# Patient Record
Sex: MALE | Employment: FULL TIME | URBAN - METROPOLITAN AREA
[De-identification: names, ages, dates, MRNs, and addresses within clinical notes are randomized per-mention and may not be internally consistent; named-entity substitution may affect disease eponyms.]

---

## 2017-02-22 DIAGNOSIS — Z72.0 TOBACCO ABUSE: ICD-10-CM

## 2017-02-22 RX ORDER — VARENICLINE TARTRATE 1 MG/1
1 TABLET, FILM COATED ORAL 2 TIMES DAILY
Qty: 60 TABLET | Refills: 0 | Status: SHIPPED | OUTPATIENT
Start: 2017-02-22 | End: 2017-03-23

## 2017-02-22 NOTE — TELEPHONE ENCOUNTER
Routing refill request to provider for review/approval because:  Labs out of range:  BP    Florida Oswald, RN  [facility]

## 2017-02-22 NOTE — TELEPHONE ENCOUNTER
varenicline (CHANTIX) 1 MG tablet     Last Written Prescription Date: 12-2-16  Last Fill Quantity: 60, # refills: 2  Last Office Visit with [facility], [facility] or [facility] prescribing provider: 12-2-16        BP Readings from Last 3 Encounters:   12/02/16 129/81   11/09/16 144/87   09/10/14 125/85

## 2017-03-23 DIAGNOSIS — Z72.0 TOBACCO ABUSE: ICD-10-CM

## 2017-03-23 NOTE — TELEPHONE ENCOUNTER
varenicline (CHANTIX) 1 MG tablet     Last Written Prescription Date: 2-22-17  Last Fill Quantity: 60, # refills: 0  Last Office Visit with [facility], [facility] or [facility] prescribing provider: 12-2-16        BP Readings from Last 3 Encounters:   12/02/16 129/81   11/09/16 144/87   09/10/14 125/85

## 2017-03-24 RX ORDER — VARENICLINE TARTRATE 1 MG/1
TABLET, FILM COATED ORAL
Qty: 60 TABLET | Refills: 0 | Status: SHIPPED | OUTPATIENT
Start: 2017-03-24 | End: 2017-04-20

## 2017-03-24 NOTE — TELEPHONE ENCOUNTER
Routing refill request to provider for review/approval because:  Max refills received per RN protocol.    Frances Ramirez RN   March 24, 2017 4:08 PM  [facility] Triage   [phone]

## 2017-04-20 DIAGNOSIS — Z72.0 TOBACCO ABUSE: ICD-10-CM

## 2017-04-20 NOTE — TELEPHONE ENCOUNTER
CHANTIX 1 MG tablet     Last Written Prescription Date: 3-24-17  Last Fill Quantity: 60, # refills: 0  Last Office Visit with [facility], [facility] or [facility] prescribing provider: 12-2-16        BP Readings from Last 3 Encounters:   12/02/16 129/81   11/09/16 144/87   09/10/14 125/85

## 2017-04-21 RX ORDER — VARENICLINE TARTRATE 1 MG/1
TABLET, FILM COATED ORAL
Qty: 56 TABLET | Refills: 0 | Status: SHIPPED | OUTPATIENT
Start: 2017-04-21 | End: 2017-08-17

## 2017-05-11 DIAGNOSIS — Z72.0 TOBACCO ABUSE: ICD-10-CM

## 2017-05-11 RX ORDER — VARENICLINE TARTRATE 1 MG/1
TABLET, FILM COATED ORAL
Qty: 56 TABLET | Refills: 0 | Status: SHIPPED | OUTPATIENT
Start: 2017-05-11 | End: 2017-08-17

## 2017-05-11 NOTE — TELEPHONE ENCOUNTER
CHANTIX CONTINUING MONTH DAVID 1 MG tablet     Last Written Prescription Date: 4-21-17  Last Fill Quantity: 56, # refills: 0  Last Office Visit with [facility], [facility] or [facility] prescribing provider: 12-2-16        BP Readings from Last 3 Encounters:   12/02/16 129/81   11/09/16 144/87   09/10/14 125/85

## 2017-05-11 NOTE — TELEPHONE ENCOUNTER
Routing refill request to provider for review/approval because:  Per protocol, only 1 refill allowed in a calendar year      Annabelle Mcgovern RN  [facility]

## 2017-05-19 ENCOUNTER — TELEPHONE (OUTPATIENT)
Dept: FAMILY MEDICINE | Facility: CLINIC | Age: 32
End: 2017-05-19

## 2017-05-19 DIAGNOSIS — F33.2 MAJOR DEPRESSIVE DISORDER, RECURRENT, SEVERE WITHOUT PSYCHOTIC FEATURES (H): ICD-10-CM

## 2017-05-19 DIAGNOSIS — F41.9 ANXIETY: ICD-10-CM

## 2017-05-22 NOTE — TELEPHONE ENCOUNTER
sertraline (ZOLOFT) 50 MG tablet     Last Written Prescription Date: 12/2/16  Last Fill Quantity: 90, # refills: 1  Last Office Visit with [facility] primary care provider:  12/2/16        Last PHQ-9 score on record=   PHQ-9 SCORE 11/11/2016   Total Score 21

## 2017-05-22 NOTE — TELEPHONE ENCOUNTER
Due for follow up.    Routing refill request to provider for review/approval because:  Elevated PHQ9, due for office visit.  Elba Lester RN  [facility]

## 2017-05-22 NOTE — TELEPHONE ENCOUNTER
Message left on home number for patient to call back TC line.  NTBS for anxiety/depression check with MARIO.    Maia CHA

## 2017-05-31 ENCOUNTER — TELEPHONE (OUTPATIENT)
Dept: FAMILY MEDICINE | Facility: CLINIC | Age: 32
End: 2017-05-31

## 2017-05-31 NOTE — TELEPHONE ENCOUNTER
Panel Management Review      Patient has the following on his problem list:     Depression / Dysthymia review  PHQ-9 SCORE 11/9/2016 11/11/2016   Total Score 21 21      Patient is due for:  PHQ9      Composite cancer screening  Chart review shows that this patient is due/due soon for the following None  Summary:    Patient is due/failing the following:   FOLLOW UP and PHQ9    Action needed:   Patient needs office visit for depression. and Patient needs to do PHQ9.    Type of outreach:    Phone, left message for patient to call back.  and Sent letter.    Questions for provider review:    None                                                                                                                                    Marla Ochoa [facility]        Chart routed to Self .

## 2017-05-31 NOTE — LETTER
May 31, 2017    Norman Shipley  [address] [address]    Dear Norman    We care about your health and have reviewed your health plan. We have reviewed your medical conditions, medication list, and lab results and are making recommendations based on this review, to better manage your health.    You are in particular need of attention regarding:  - Your Depression      Here is a list of Health Maintenance topics that are due now or due soon:  Health Maintenance Due   Topic Date Due     PHQ-9 Q6 MONTHS  05/11/2017     We will be calling you in the next couple of weeks to help you schedule any appointments that are needed.  Please call us at [phone] (or use POPAPP) to address the above recommendations.     Thank you for trusting [facility] and we appreciate the opportunity to serve you.  We look forward to supporting your healthcare needs in the future.    Healthy Regards,    Your [facility] Care Team/nr

## 2017-05-31 NOTE — LETTER
[address] [address]  [phone]      June 16, 2017    Norman Shipley  [address] [facility] [address] [address]      Dear Norman    We have been trying to contact you and have not had any success.  Please call the clinic at [phone] to update your contact information and schedule a follow up with your primary provider to discuss your Anxiety/ Depression.     Enclosed with this letter is a questionnaire about depression for your upcoming visit and or contact, please have it filled out and bring it with you to your follow up or mail it back to us in the provided envelope. Please note that if you mail it your care team may not see this information before your appointment. We may ask that you fill out another one the day of your visit.     We care about you and want to make sure you get the best care possible. If at any time you feel unsafe or have concerns about the safety of others please take immediate action by calling [phone], for mental health crisis phone support 24 hours a day, 365 days per year. As always, you can also go to your local ER, or call 911 if you have immediate safety concerns.    Thank you for your time!  Your [facility] Care Team/ nr

## 2017-07-25 ENCOUNTER — FCC EXTENDED DOCUMENTATION (OUTPATIENT)
Dept: BEHAVIORAL HEALTH | Facility: CLINIC | Age: 32
End: 2017-07-25

## 2017-07-25 NOTE — PROGRESS NOTES
Discharge Summary  Single Session    Client Name: Norman Shipley MRN#: [id number] YOB: 1985      Intake / Discharge Date: 11/11/16  -  7/25/2017       DSM5 Diagnoses: (Sustained by DSM5 Criteria Listed Above)  DSM5 Diagnoses: (Sustained by DSM5 Criteria Listed Above)  Diagnoses: 296.33 Major Depressive Disorder, Recurrent Episode, Severe _  300.02 (F41.1) Generalized Anxiety Disorder  Psychosocial & Contextual Factors: recent breakup; financial stress; recently moved back in with parents; two kids he doesn't see as much as he'd like  WHODAS 2.0 (12 item)            This questionnaire asks about difficulties due to health conditions. Health conditions  include  disease or illnesses, other health problems that may be short or long lasting,  injuries, mental health or emotional problems, and problems with alcohol or drugs.                     Think back over the past 30 days and answer these questions, thinking about how much  difficulty you had doing the following activities. For each question, please [place] only  one response.    S1 Standing for long periods such as 30 minutes? None =         1   S2 Taking care of household responsibilities? Mild =           2   S3 Learning a new task, for example, learning how to get to a new place? None =         1   S4 How much of a problem do you have joining community activities (for example, festivals, Mosque or other activities) in the same way as anyone else can? Mild =           2   S5 How much have you been emotionally affected by your health problems? Severe =       4     In the past 30 days, how much difficulty did you have in:   S6 Concentrating on doing something for ten minutes? Moderate =   3   S7 Walking a long distance such as a kilometer (or equivalent)? None =         1   S8 Washing your whole body? None =         1   S9 Getting dressed? None =         1   S10 Dealing with people you do not know? None =         1   S11 Maintaining  a friendship? None =         1   S12 Your day to day work? None =         1     H1 Overall, in the past 30 days, how many days were these difficulties present? Record number of days 10   H2 In the past 30 days, for how many days were you totally unable to carry out your usual activities or work because of any health condition? Record number of days  0   H3 In the past 30 days, not counting the days that you were totally unable, for how many days did you cut back or reduce your usual activities or work because of any health condition? Record number of days 0             Presenting Concern:  depression      Reason for Discharge:  Client did not return      Disposition at Time of Last Encounter:   Comments:   n/a     Risk Management:   Client denies a history of suicidal ideation, suicide attempts, self-injurious behavior, homicidal ideation, homicidal behavior and and other safety concerns  A safety and risk management plan has not been developed at this time, however client was given the after-hours number / 911 should there be a change in any of these risk factors.      Referred To:  PCP.  Client can return to [facility] at any point in the future if desired.         Garret Mendoza, LICSW   7/25/2017

## 2017-08-03 ENCOUNTER — OFFICE VISIT (OUTPATIENT)
Dept: FAMILY MEDICINE | Facility: CLINIC | Age: 32
End: 2017-08-03
Payer: COMMERCIAL

## 2017-08-03 VITALS
HEART RATE: 72 BPM | SYSTOLIC BLOOD PRESSURE: 116 MMHG | HEIGHT: 68 IN | BODY MASS INDEX: 26.52 KG/M2 | WEIGHT: 175 LBS | DIASTOLIC BLOOD PRESSURE: 74 MMHG | TEMPERATURE: 98.8 F

## 2017-08-03 DIAGNOSIS — F43.23 ADJUSTMENT DISORDER WITH MIXED ANXIETY AND DEPRESSED MOOD: ICD-10-CM

## 2017-08-03 DIAGNOSIS — F33.2 MAJOR DEPRESSIVE DISORDER, RECURRENT, SEVERE WITHOUT PSYCHOTIC FEATURES (H): Primary | ICD-10-CM

## 2017-08-03 DIAGNOSIS — F41.1 GAD (GENERALIZED ANXIETY DISORDER): ICD-10-CM

## 2017-08-03 PROCEDURE — 99214 OFFICE O/P EST MOD 30 MIN: CPT | Performed by: PHYSICIAN ASSISTANT

## 2017-08-03 RX ORDER — HYDROXYZINE HYDROCHLORIDE 25 MG/1
TABLET, FILM COATED ORAL
Qty: 60 TABLET | Refills: 1 | Status: SHIPPED | OUTPATIENT
Start: 2017-08-03 | End: 2017-09-15

## 2017-08-03 ASSESSMENT — ANXIETY QUESTIONNAIRES
1. FEELING NERVOUS, ANXIOUS, OR ON EDGE: SEVERAL DAYS
6. BECOMING EASILY ANNOYED OR IRRITABLE: MORE THAN HALF THE DAYS
GAD7 TOTAL SCORE: 6
2. NOT BEING ABLE TO STOP OR CONTROL WORRYING: SEVERAL DAYS
5. BEING SO RESTLESS THAT IT IS HARD TO SIT STILL: SEVERAL DAYS
3. WORRYING TOO MUCH ABOUT DIFFERENT THINGS: NOT AT ALL
7. FEELING AFRAID AS IF SOMETHING AWFUL MIGHT HAPPEN: NOT AT ALL

## 2017-08-03 ASSESSMENT — PATIENT HEALTH QUESTIONNAIRE - PHQ9: 5. POOR APPETITE OR OVEREATING: SEVERAL DAYS

## 2017-08-03 NOTE — MR AVS SNAPSHOT
"              After Visit Summary   8/3/2017    Norman Shipley    MRN: [id number]           Patient Information     Date Of Birth          1985        Visit Information        Provider Department      8/3/2017 12:40 PM Kyle Corbin PA-C [facility]        Today's Diagnoses     Major depressive disorder, recurrent, severe without psychotic features (H)    -  1    WILLIAM (generalized anxiety disorder)        Adjustment disorder with mixed anxiety and depressed mood           Follow-ups after your visit        Who to contact     If you have questions or need follow up information about today's clinic visit or your schedule please contact [facility] directly at [phone].  Normal or non-critical lab and imaging results will be communicated to you by MyChart, letter or phone within 4 business days after the clinic has received the results. If you do not hear from us within 7 days, please contact the clinic through MyChart or phone. If you have a critical or abnormal lab result, we will notify you by phone as soon as possible.  Submit refill requests through CrossFiber or call your pharmacy and they will forward the refill request to us. Please allow 3 business days for your refill to be completed.          Additional Information About Your Visit        MyChart Information     CrossFiber lets you send messages to your doctor, view your test results, renew your prescriptions, schedule appointments and more. To sign up, go to [email or web address] . Click on \"Log in\" on the left side of the screen, which will take you to the Welcome page. Then click on \"Sign up Now\" on the right side of the page.     You will be asked to enter the access code listed below, as well as some personal information. Please follow the directions to create your username and password.     Your access code is: [email or web address]  Expires: 2017  9:37 AM     Your access code will  in 90 days. If you need " "help or a new code, please call your [facility] clinic or [phone].        Care EveryWhere ID     This is your Care EveryWhere ID. This could be used by other organizations to access your [facility] medical records  [id number]        Your Vitals Were     Pulse Temperature Height BMI (Body Mass Index)          72 98.8  F (37.1  C) (Oral) 5' 7.5\" (1.715 m) 27 kg/m2         Blood Pressure from Last 3 Encounters:   08/03/17 116/74   12/02/16 129/81   11/09/16 144/87    Weight from Last 3 Encounters:   08/03/17 175 lb (79.4 kg)   12/02/16 174 lb (78.9 kg)   11/09/16 176 lb (79.8 kg)              Today, you had the following     No orders found for display         Today's Medication Changes          These changes are accurate as of: 8/3/17  1:22 PM.  If you have any questions, ask your nurse or doctor.               Start taking these medicines.        Dose/Directions    hydrOXYzine 25 MG tablet   Commonly known as:  ATARAX   Used for:  Major depressive disorder, recurrent, severe without psychotic features (H), WILLIAM (generalized anxiety disorder), Adjustment disorder with mixed anxiety and depressed mood   Started by:  Kyle Corbin PA-C        1 to 2 pills 1 to 2 times daily as needed for panic/anxiety   Quantity:  60 tablet   Refills:  1         These medicines have changed or have updated prescriptions.        Dose/Directions    * sertraline 50 MG tablet   Commonly known as:  ZOLOFT   This may have changed:  Another medication with the same name was added. Make sure you understand how and when to take each.   Used for:  Anxiety, Major depressive disorder, recurrent, severe without psychotic features (H)   Changed by:  Hernando Irvin MD        TAKE 1 TABLET (50 MG) BY MOUTH DAILY   Quantity:  30 tablet   Refills:  0       * sertraline 50 MG tablet   Commonly known as:  ZOLOFT   This may have changed:  You were already taking a medication with the same name, and this prescription was added. Make sure you " understand how and when to take each.   Used for:  Major depressive disorder, recurrent, severe without psychotic features (H), WILLIAM (generalized anxiety disorder), Adjustment disorder with mixed anxiety and depressed mood   Changed by:  Kyle Corbin PA-C        1 and 1/2 tablet daily   Quantity:  45 tablet   Refills:  0       * Notice:  This list has 2 medication(s) that are the same as other medications prescribed for you. Read the directions carefully, and ask your doctor or other care provider to review them with you.         Where to get your medicines      These medications were sent to [place] [facility] [address] [facility] [place] - [address]  [address], [place] [address]     Phone:  [phone]     hydrOXYzine 25 MG tablet    sertraline 50 MG tablet                Primary Care Provider Office Phone # Fax #    Juice Nascimento [phone]       [facility] [address]  [facility] [address]        Equal Access to Services     BHARATI [facility] CenterLUDY : Hadii aad ku hadasho Soomaali, waaxda luqadaha, qaybta kaalmada adeegyada, waxay idiin hayaan adeeg kharapedro luis la'kp . So [facility] [phone].    ATENCIÓN: Si habla español, tiene a downing disposición servicios gratuitos de asistencia lingüística. Llame al [phone].    We comply with applicable federal civil rights laws and [place] laws. We do not discriminate on the basis of race, color, national origin, age, disability sex, sexual orientation or gender identity.            Thank you!     Thank you for choosing [facility]  for your care. Our goal is always to provide you with excellent care. Hearing back from our patients is one way we can continue to improve our services. Please take a few minutes to complete the written survey that you may receive in the mail after your visit with us. Thank you!             Your Updated Medication List - Protect others around you: Learn how to safely use, store and  throw away your medicines at www.disposemymeds.org.          This list is accurate as of: 8/3/17  1:22 PM.  Always use your most recent med list.                   Brand Name Dispense Instructions for use Diagnosis    * CHANTIX CONTINUING MONTH DAVID 1 MG tablet   Generic drug:  varenicline     56 tablet    TAKE 1 TABLET BY MOUTH 2 TIMES DAILY    Tobacco abuse       * CHANTIX CONTINUING MONTH DAVID 1 MG tablet   Generic drug:  varenicline     56 tablet    TAKE 1 TABLET BY MOUTH 2 TIMES DAILY    Tobacco abuse       hydrOXYzine 25 MG tablet    ATARAX    60 tablet    1 to 2 pills 1 to 2 times daily as needed for panic/anxiety    Major depressive disorder, recurrent, severe without psychotic features (H), WILLIAM (generalized anxiety disorder), Adjustment disorder with mixed anxiety and depressed mood       IBUPROFEN           loratadine 10 MG tablet    CLARITIN     Take 10 mg by mouth daily        * sertraline 50 MG tablet    ZOLOFT    30 tablet    TAKE 1 TABLET (50 MG) BY MOUTH DAILY    Anxiety, Major depressive disorder, recurrent, severe without psychotic features (H)       * sertraline 50 MG tablet    ZOLOFT    45 tablet    1 and 1/2 tablet daily    Major depressive disorder, recurrent, severe without psychotic features (H), WILLIAM (generalized anxiety disorder), Adjustment disorder with mixed anxiety and depressed mood       triamcinolone 0.1 % cream    KENALOG    30 g    Apply 2x daily as needed to affected areas    Psoriasis       * Notice:  This list has 4 medication(s) that are the same as other medications prescribed for you. Read the directions carefully, and ask your doctor or other care provider to review them with you.

## 2017-08-03 NOTE — PROGRESS NOTES
"  SUBJECTIVE:                                                    Norman Shipley is a 32 year old male who presents to clinic today for the following health issues:    Mr. Shipley is concerned about worsening depression and anxiety. This began about 6 weeks ago. He reports having less energy and more fatigue during the day. He states he had three episodes that felt like panic attacks with increased heart rate and tremors.     He attributes his symptoms to not being able to talk to his daughter for about 3 months. Before this, he felt like his mood had stabilized after doing two sessions of talk therapy and starting sertraline 50 mg daily. He says when he is not busy he can't stop thinking about this situation. The daughter lives with her mother. He states he does not know why the daughter won't speak to him.       Depression Followup    Status since last visit: Improved, but he is going through another thing which is causing it to drop down    See PHQ-9 for current symptoms.  Other associated symptoms: None    Complicating factors:   Significant life event:  No   Current substance abuse:  None  Anxiety or Panic symptoms:  Yes-  anxiety    PHQ-9  English  PHQ-9   Any Language      Amount of exercise or physical activity: not as much as he should    Problems taking medications regularly: Yes,  problems remembering to take    Medication side effects: none  Diet: regular (no restrictions)      Problem list and histories reviewed & adjusted, as indicated.  Additional history: as documented    Labs reviewed in EPIC    Reviewed and updated as needed this visit by clinical staffTobacco  Allergies  Med Hx  Surg Hx  Fam Hx  Soc Hx      Reviewed and updated as needed this visit by Provider         ROS:  Constitutional, HEENT, cardiovascular, pulmonary, gi and gu systems are negative, except as otherwise noted.      OBJECTIVE:   /74 (Cuff Size: Adult Regular)  Pulse 72  Temp 98.8  F (37.1  C) (Oral)  Ht 5' 7.5\" (1.715 m) "  Wt 175 lb (79.4 kg)  BMI 27 kg/m2  Body mass index is 27 kg/(m^2).  GENERAL: healthy, alert and no distress  Psych: Appropriate appearance.  Alert and oriented times 3; coherent speech, normal   rate and volume, able to articulate logical thoughts, able   to abstract reason, no tangential thoughts, no hallucinations   or delusions.  Normal behavior.  His affect is flat.      ASSESSMENT/PLAN:     (F33.2) Major depressive disorder, recurrent, severe without psychotic features (H)  (primary encounter diagnosis)  Comment:   Plan: sertraline (ZOLOFT) 50 MG tablet, hydrOXYzine         (ATARAX) 25 MG tablet        Worse. Reviewed options. Recommend increase sertraline. Therapy. Hydroxyzine given for anxiety / panic type symptoms.    (F41.1) WILLIAM (generalized anxiety disorder)  Comment:   Plan: sertraline (ZOLOFT) 50 MG tablet, hydrOXYzine         (ATARAX) 25 MG tablet        As noted - increase. Self cares. Exercise.     (F43.23) Adjustment disorder with mixed anxiety and depressed mood  Comment:   Plan: sertraline (ZOLOFT) 50 MG tablet, hydrOXYzine         (ATARAX) 25 MG tablet          I think his symptoms are reasonable. He's not able to speak to his daughter. His relationship is strained. His ex- is not cordial. He has a young child and [address] on the [address]. Self cares, exercise, diet, lifestyle - refer to therapist. Follow up in 2 weeks via phone    30 min visit over 50% counseling today       KATIE ARROYO PA-C  [facility]

## 2017-08-03 NOTE — NURSING NOTE
"Chief Complaint   Patient presents with     Depression       Initial /74 (Cuff Size: Adult Regular)  Pulse 72  Temp 98.8  F (37.1  C) (Oral)  Ht 5' 7.5\" (1.715 m)  Wt 175 lb (79.4 kg)  BMI 27 kg/m2 Estimated body mass index is 27 kg/(m^2) as calculated from the following:    Height as of this encounter: 5' 7.5\" (1.715 m).    Weight as of this encounter: 175 lb (79.4 kg).  Medication Reconciliation: complete   Faustina Singh, Certified Medical Assistant (AAMA)     "

## 2017-08-04 ASSESSMENT — ANXIETY QUESTIONNAIRES: GAD7 TOTAL SCORE: 6

## 2017-08-04 ASSESSMENT — PATIENT HEALTH QUESTIONNAIRE - PHQ9: SUM OF ALL RESPONSES TO PHQ QUESTIONS 1-9: 10

## 2017-08-17 ENCOUNTER — VIRTUAL VISIT (OUTPATIENT)
Dept: FAMILY MEDICINE | Facility: CLINIC | Age: 32
End: 2017-08-17
Payer: COMMERCIAL

## 2017-08-17 DIAGNOSIS — F33.2 MAJOR DEPRESSIVE DISORDER, RECURRENT, SEVERE WITHOUT PSYCHOTIC FEATURES (H): ICD-10-CM

## 2017-08-17 DIAGNOSIS — F41.1 GAD (GENERALIZED ANXIETY DISORDER): ICD-10-CM

## 2017-08-17 DIAGNOSIS — F43.23 ADJUSTMENT DISORDER WITH MIXED ANXIETY AND DEPRESSED MOOD: ICD-10-CM

## 2017-08-17 PROCEDURE — 98966 PH1 ASSMT&MGMT NQHP 5-10: CPT | Performed by: PHYSICIAN ASSISTANT

## 2017-08-17 ASSESSMENT — ANXIETY QUESTIONNAIRES
GAD7 TOTAL SCORE: 7
6. BECOMING EASILY ANNOYED OR IRRITABLE: MORE THAN HALF THE DAYS
3. WORRYING TOO MUCH ABOUT DIFFERENT THINGS: SEVERAL DAYS
1. FEELING NERVOUS, ANXIOUS, OR ON EDGE: SEVERAL DAYS
7. FEELING AFRAID AS IF SOMETHING AWFUL MIGHT HAPPEN: NOT AT ALL
5. BEING SO RESTLESS THAT IT IS HARD TO SIT STILL: SEVERAL DAYS
2. NOT BEING ABLE TO STOP OR CONTROL WORRYING: SEVERAL DAYS

## 2017-08-17 ASSESSMENT — PATIENT HEALTH QUESTIONNAIRE - PHQ9
5. POOR APPETITE OR OVEREATING: SEVERAL DAYS
SUM OF ALL RESPONSES TO PHQ QUESTIONS 1-9: 8

## 2017-08-17 NOTE — PROGRESS NOTES
"Norman OROZCO Case is a 32 year old male who is being evaluated via a telephone visit.      The patient has been notified of following:     \"This telephone visit will be conducted via a call between you and your physician/provider. We have found that certain health care needs can be provided without the need for a physical exam.  This service lets us provide the care you need with a short phone conversation.  If a prescription is necessary we can send it directly to your pharmacy.  If lab work is needed we can place an order for that and you can then stop by our lab to have the test done at a later time.    We will bill your insurance company for this service.  Please check with your medical insurance if this type of visit is covered. You may be responsible for the cost of this type of visit if insurance coverage is denied.  The typical cost is $30 (10min), $59(11-20min) and $85 (21-30min).  Most often these visits are shorter than 10 minutes.    If during the course of the call the physician/provider feels a telephone visit is not appropriate, you will not be charged for this service. \"     Consent has been obtained for this service by 1 care team member:yes. See the scanned image in the medical record.    Preferred patient phone number to be used for this call: [phone]     Norman OROZCO Case complains of  Depression and anxiety    History reviewed. No pertinent past medical history.   Social History     Social History     Marital status: Single     Spouse name: N/A     Number of children: 0     Years of education: N/A     Occupational History      [employer]     Social History Main Topics     Smoking status: Current Every Day Smoker     Packs/day: 0.50     Types: Cigarettes     Smokeless tobacco: Never Used      Comment: occassional     Alcohol use 2.5 oz/week     5 Standard drinks or equivalent per week     Drug use: No     Sexual activity: Yes     Partners: Female     Birth control/ protection: IUD "     Other Topics Concern     Not on file     Social History Narrative     ALLERGIES  Review of patient's allergies indicates no known allergies.        Faustina Singh, Certified Medical Assistant (AAMA)  (MA signature)    Additional provider notes:  Doing okay. Mood is improving a bit. Denies side effects from the sertraline. Reports that he's been able to talk to his daughter finally. Will be doing some family based counseling with her.     Assessment/Plan:    ICD-10-CM    1. Major depressive disorder, recurrent, severe without psychotic features (H) F33.2 sertraline (ZOLOFT) 50 MG tablet   2. WILLIAM (generalized anxiety disorder) F41.1 sertraline (ZOLOFT) 50 MG tablet   3. Adjustment disorder with mixed anxiety and depressed mood F43.23 sertraline (ZOLOFT) 50 MG tablet      Improving - continued self cares, lifestyle, exercise, etc discussed. Follow up in 3 months via phone, mychart or in person, earlier if issues.    Total time spent on this phone visit with the patient = 6 minutes     I have reviewed the note as documented above.  This accurately captures the substance of my conversation with the patient.    Kyle Corbin, JAZMIN, PA-C

## 2017-08-17 NOTE — MR AVS SNAPSHOT
"              After Visit Summary   2017    Norman Shipley    MRN: [id number]           Patient Information     Date Of Birth          1985        Visit Information        Provider Department      2017 2:40 PM Kyle Corbin PA-C [facility]        Today's Diagnoses     Major depressive disorder, recurrent, severe without psychotic features (H)        WILLIAM (generalized anxiety disorder)        Adjustment disorder with mixed anxiety and depressed mood           Follow-ups after your visit        Who to contact     If you have questions or need follow up information about today's clinic visit or your schedule please contact [facility] directly at [phone].  Normal or non-critical lab and imaging results will be communicated to you by MyChart, letter or phone within 4 business days after the clinic has received the results. If you do not hear from us within 7 days, please contact the clinic through MyChart or phone. If you have a critical or abnormal lab result, we will notify you by phone as soon as possible.  Submit refill requests through [employer] or call your pharmacy and they will forward the refill request to us. Please allow 3 business days for your refill to be completed.          Additional Information About Your Visit        MyChart Information     [employer] lets you send messages to your doctor, view your test results, renew your prescriptions, schedule appointments and more. To sign up, go to [email or web address] . Click on \"Log in\" on the left side of the screen, which will take you to the Welcome page. Then click on \"Sign up Now\" on the right side of the page.     You will be asked to enter the access code listed below, as well as some personal information. Please follow the directions to create your username and password.     Your access code is: [email or web address]  Expires: 2017  9:37 AM     Your access code will  in 90 days. If you need help " or a new code, please call your [place] clinic or [phone].        Care EveryWhere ID     This is your Care EveryWhere ID. This could be used by other organizations to access your [place] medical records  [id number]         Blood Pressure from Last 3 Encounters:   08/03/17 116/74   12/02/16 129/81   11/09/16 144/87    Weight from Last 3 Encounters:   08/03/17 175 lb (79.4 kg)   12/02/16 174 lb (78.9 kg)   11/09/16 176 lb (79.8 kg)              Today, you had the following     No orders found for display         Today's Medication Changes          These changes are accurate as of: 8/17/17 11:59 PM.  If you have any questions, ask your nurse or doctor.               These medicines have changed or have updated prescriptions.        Dose/Directions    sertraline 50 MG tablet   Commonly known as:  ZOLOFT   This may have changed:  Another medication with the same name was removed. Continue taking this medication, and follow the directions you see here.   Used for:  Major depressive disorder, recurrent, severe without psychotic features (H), WILLIAM (generalized anxiety disorder), Adjustment disorder with mixed anxiety and depressed mood   Changed by:  Kyle Corbin PA-C        1 and 1/2 tablet daily   Quantity:  135 tablet   Refills:  0            Where to get your medicines      These medications were sent to [place] [address] [place] [facility] [address]  [address] [facility] [address]     Phone:  [phone]     sertraline 50 MG tablet                Primary Care Provider Office Phone # Fax #    Juice Nascimento [phone]       [address]  [facility] [address]        Equal Access to Services     [facility]: Hadii aad ku hadasho Soomaali, waaxda luqadaha, qaybta kaalmada adeegyada, noris simpsonn alanna reich . So [facility] [place] [facility] [phone].    ATENCIÓN: Si habla español, tiene a downing disposición servicios gratuitos de asistencia lingüística. Llame al  [phone].    We comply with applicable federal civil rights laws and [place] laws. We do not discriminate on the basis of race, color, national origin, age, disability sex, sexual orientation or gender identity.            Thank you!     Thank you for choosing [facility]  for your care. Our goal is always to provide you with excellent care. Hearing back from our patients is one way we can continue to improve our services. Please take a few minutes to complete the written survey that you may receive in the mail after your visit with us. Thank you!             Your Updated Medication List - Protect others around you: Learn how to safely use, store and throw away your medicines at www.disposemymeds.org.          This list is accurate as of: 8/17/17 11:59 PM.  Always use your most recent med list.                   Brand Name Dispense Instructions for use Diagnosis    hydrOXYzine 25 MG tablet    ATARAX    60 tablet    1 to 2 pills 1 to 2 times daily as needed for panic/anxiety    Major depressive disorder, recurrent, severe without psychotic features (H), WILLIAM (generalized anxiety disorder), Adjustment disorder with mixed anxiety and depressed mood       sertraline 50 MG tablet    ZOLOFT    135 tablet    1 and 1/2 tablet daily    Major depressive disorder, recurrent, severe without psychotic features (H), WILLIAM (generalized anxiety disorder), Adjustment disorder with mixed anxiety and depressed mood

## 2017-08-18 ASSESSMENT — ANXIETY QUESTIONNAIRES: GAD7 TOTAL SCORE: 7

## 2017-09-15 DIAGNOSIS — F33.2 MAJOR DEPRESSIVE DISORDER, RECURRENT, SEVERE WITHOUT PSYCHOTIC FEATURES (H): ICD-10-CM

## 2017-09-15 DIAGNOSIS — F41.1 GAD (GENERALIZED ANXIETY DISORDER): ICD-10-CM

## 2017-09-15 DIAGNOSIS — F43.23 ADJUSTMENT DISORDER WITH MIXED ANXIETY AND DEPRESSED MOOD: ICD-10-CM

## 2017-09-15 RX ORDER — HYDROXYZINE HYDROCHLORIDE 25 MG/1
TABLET, FILM COATED ORAL
Qty: 60 TABLET | Refills: 1 | Status: SHIPPED | OUTPATIENT
Start: 2017-09-15

## 2017-09-15 NOTE — TELEPHONE ENCOUNTER
hydrOXYzine (ATARAX) 25 MG tablet    1 ordered  Edit     Summary: 1 to 2 pills 1 to 2 times daily as needed for panic/anxiety, Disp-60 tablet, R-1, E-Prescribe   Start: 8/3/2017  Ord/Sold: 8/3/2017 (O)  Report  Taking:   Long-term:   Pharmacy: [facility] [id number] [place] [facility], [place] - [address]  Med Dose History       Patient Si to 2 pills 1 to 2 times daily as needed for panic/anxiety       Ordered on: 8/3/2017       Authorized by: KATIE ARROYO       Dispense: 60 tablet       Admin Instructions: 1 to 2 pills 1 to 2 times daily as needed for panic/anxiety       Prior Authorization: Request PA          Last Office Visit with [facility], [facility] or [facility] prescribing provider: 8-3-2017

## 2017-11-27 ENCOUNTER — TELEPHONE (OUTPATIENT)
Dept: FAMILY MEDICINE | Facility: CLINIC | Age: 32
End: 2017-11-27

## 2017-11-27 DIAGNOSIS — F41.1 GAD (GENERALIZED ANXIETY DISORDER): ICD-10-CM

## 2017-11-27 DIAGNOSIS — F43.23 ADJUSTMENT DISORDER WITH MIXED ANXIETY AND DEPRESSED MOOD: ICD-10-CM

## 2017-11-27 DIAGNOSIS — F33.2 MAJOR DEPRESSIVE DISORDER, RECURRENT, SEVERE WITHOUT PSYCHOTIC FEATURES (H): ICD-10-CM

## 2017-11-29 NOTE — TELEPHONE ENCOUNTER
Routing refill request to provider for review/approval because:  Elevated PHQ-9 score > 5      Annabelle Mcgovern RN  [facility]

## 2017-11-29 NOTE — TELEPHONE ENCOUNTER
Requested Prescriptions   Pending Prescriptions Disp Refills     sertraline (ZOLOFT) 50 MG tablet [Pharmacy Med Name: SERTRALINE HCL 50 MG TABLET] 135 tablet 0     Sig: TAKE 1 AND 1/2 TABLETS BY MOUTH DAILY.    SSRIs Protocol Failed    11/27/2017  1:15 PM       Failed - PHQ-9 score less than 5 in past 6 months    Please review PHQ-9 score.          Passed - Medication is NOT Bupropion    If the medication is Bupropion (Wellbutrin), and the patient is taking for smoking cessation; OK to refill.         Passed - Patient is age 18 or older       Passed - Recent (6 mo) or future visit with authorizing provider's specialty    Patient had office visit in the last 6 months or has a visit in the next 30 days with authorizing provider.  See chart review.             PHQ-9 SCORE 11/11/2016 8/3/2017 8/17/2017   Total Score 21 10 8

## 2017-12-15 ENCOUNTER — VIRTUAL VISIT (OUTPATIENT)
Dept: FAMILY MEDICINE | Facility: CLINIC | Age: 32
End: 2017-12-15
Payer: COMMERCIAL

## 2017-12-15 DIAGNOSIS — F41.1 GAD (GENERALIZED ANXIETY DISORDER): ICD-10-CM

## 2017-12-15 DIAGNOSIS — F90.9 ATTENTION DEFICIT HYPERACTIVITY DISORDER (ADHD), UNSPECIFIED ADHD TYPE: ICD-10-CM

## 2017-12-15 DIAGNOSIS — F33.2 MAJOR DEPRESSIVE DISORDER, RECURRENT, SEVERE WITHOUT PSYCHOTIC FEATURES (H): Primary | ICD-10-CM

## 2017-12-15 PROCEDURE — 98966 PH1 ASSMT&MGMT NQHP 5-10: CPT | Performed by: PHYSICIAN ASSISTANT

## 2017-12-15 RX ORDER — DEXTROAMPHETAMINE SACCHARATE, AMPHETAMINE ASPARTATE MONOHYDRATE, DEXTROAMPHETAMINE SULFATE AND AMPHETAMINE SULFATE 5; 5; 5; 5 MG/1; MG/1; MG/1; MG/1
20 CAPSULE, EXTENDED RELEASE ORAL DAILY
Qty: 30 CAPSULE | Refills: 0 | Status: SHIPPED | OUTPATIENT
Start: 2017-12-15

## 2017-12-15 ASSESSMENT — ANXIETY QUESTIONNAIRES
6. BECOMING EASILY ANNOYED OR IRRITABLE: SEVERAL DAYS
3. WORRYING TOO MUCH ABOUT DIFFERENT THINGS: NOT AT ALL
7. FEELING AFRAID AS IF SOMETHING AWFUL MIGHT HAPPEN: NOT AT ALL
5. BEING SO RESTLESS THAT IT IS HARD TO SIT STILL: SEVERAL DAYS
2. NOT BEING ABLE TO STOP OR CONTROL WORRYING: NOT AT ALL
GAD7 TOTAL SCORE: 4
1. FEELING NERVOUS, ANXIOUS, OR ON EDGE: SEVERAL DAYS

## 2017-12-15 ASSESSMENT — PATIENT HEALTH QUESTIONNAIRE - PHQ9
5. POOR APPETITE OR OVEREATING: SEVERAL DAYS
SUM OF ALL RESPONSES TO PHQ QUESTIONS 1-9: 5

## 2017-12-15 NOTE — TELEPHONE ENCOUNTER
Patient picking up envelope, ID verified and envelope handed to patient.    .Jannet Hall  Patient Representative

## 2017-12-15 NOTE — MR AVS SNAPSHOT
After Visit Summary   12/15/2017    Norman Shipley    MRN: [id number]           Patient Information     Date Of Birth          1985        Visit Information        Provider Department      12/15/2017 1:00 PM Kyle Corbin PA-C [facility]        Today's Diagnoses     Major depressive disorder, recurrent, severe without psychotic features (H)    -  1    WILLIAM (generalized anxiety disorder)        Attention deficit hyperactivity disorder (ADHD), unspecified ADHD type           Follow-ups after your visit        Additional Services     MENTAL HEALTH REFERRAL  - Adult; Assessments and Testing; ADHD; [facility]: [facility] [phone]; We will contact you to schedule the appointment or please call with any questions       All scheduling is subject to the client's specific insurance plan & benefits, provider/location availability, and provider clinical specialities.  Please arrive 15 minutes early for your first appointment and bring your completed paperwork.    Please be aware that coverage of these services is subject to the terms and limitations of your health insurance plan.  Call member services at your health plan with any benefit or coverage questions.                            Who to contact     If you have questions or need follow up information about today's clinic visit or your schedule please contact [facility] directly at [phone].  Normal or non-critical lab and imaging results will be communicated to you by MyChart, letter or phone within 4 business days after the clinic has received the results. If you do not hear from us within 7 days, please contact the clinic through MyChart or phone. If you have a critical or abnormal lab result, we will notify you by phone as soon as possible.  Submit refill requests through [employer] or call your pharmacy and they will forward the refill request to us. Please allow 3 business days for your  "refill to be completed.          Additional Information About Your Visit        [employer] Information     [employer] lets you send messages to your doctor, view your test results, renew your prescriptions, schedule appointments and more. To sign up, go to [email or web address] . Click on \"Log in\" on the left side of the screen, which will take you to the Welcome page. Then click on \"Sign up Now\" on the right side of the page.     You will be asked to enter the access code listed below, as well as some personal information. Please follow the directions to create your username and password.     Your access code is: [email or web address]  Expires: 3/19/2018 12:50 PM     Your access code will  in 90 days. If you need help or a new code, please call your [place] clinic or [phone].        Care EveryWhere ID     This is your Care EveryWhere ID. This could be used by other organizations to access your [place] medical records  [id number]         Blood Pressure from Last 3 Encounters:   17 116/74   16 129/81   16 144/87    Weight from Last 3 Encounters:   17 175 lb (79.4 kg)   16 174 lb (78.9 kg)   16 176 lb (79.8 kg)              We Performed the Following     MENTAL HEALTH REFERRAL  - Adult; Assessments and Testing; ADHD; [facility]: [facility] [phone]; We will contact you to schedule the appointment or please call with any questions          Today's Medication Changes          These changes are accurate as of: 12/15/17 11:59 PM.  If you have any questions, ask your nurse or doctor.               Start taking these medicines.        Dose/Directions    amphetamine-dextroamphetamine 20 MG per 24 hr capsule   Commonly known as:  ADDERALL XR   Used for:  Attention deficit hyperactivity disorder (ADHD), unspecified ADHD type   Started by:  Kyle Corbin PA-C        Dose:  20 mg   Take 1 capsule (20 mg) by mouth daily   Quantity:  30 capsule   Refills:  0          "   Where to get your medicines      Some of these will need a paper prescription and others can be bought over the counter.  Ask your nurse if you have questions.     Bring a paper prescription for each of these medications     amphetamine-dextroamphetamine 20 MG per 24 hr capsule                Primary Care Provider Office Phone # Fax #    Kyle Corbin PA-C [phone]       [address] [facility] [address]        Equal Access to Services     BHARATI REDDY : Hadii aad ku hadasho Soomaali, waaxda luqadaha, qaybta kaalmada adeegyada, waxay idiin hayaan adeeg kharash laumer . So [facility] [phone].    ATENCIÓN: Si habla español, tiene a downing disposición servicios gratuitos de asistencia lingüística. Ename al [phone].    We comply with applicable federal civil rights laws and [place] laws. We do not discriminate on the basis of race, color, national origin, age, disability, sex, sexual orientation, or gender identity.            Thank you!     Thank you for choosing [facility]  for your care. Our goal is always to provide you with excellent care. Hearing back from our patients is one way we can continue to improve our services. Please take a few minutes to complete the written survey that you may receive in the mail after your visit with us. Thank you!             Your Updated Medication List - Protect others around you: Learn how to safely use, store and throw away your medicines at www.disposemymeds.org.          This list is accurate as of: 12/15/17 11:59 PM.  Always use your most recent med list.                   Brand Name Dispense Instructions for use Diagnosis    amphetamine-dextroamphetamine 20 MG per 24 hr capsule    ADDERALL XR    30 capsule    Take 1 capsule (20 mg) by mouth daily    Attention deficit hyperactivity disorder (ADHD), unspecified ADHD type       hydrOXYzine 25 MG tablet    ATARAX    60 tablet    TAKE 1-2 TABLETS 1-2 TIMES DAILY AS NEEDED FOR  PANIC/ANXIETY    Major depressive disorder, recurrent, severe without psychotic features (H), WILLIAM (generalized anxiety disorder), Adjustment disorder with mixed anxiety and depressed mood       sertraline 50 MG tablet    ZOLOFT    135 tablet    TAKE 1 AND 1/2 TABLETS BY MOUTH DAILY.    Major depressive disorder, recurrent, severe without psychotic features (H), WILLIAM (generalized anxiety disorder), Adjustment disorder with mixed anxiety and depressed mood

## 2017-12-15 NOTE — PROGRESS NOTES
"Norman OROZCO Case is a 32 year old male who is being evaluated via a telephone visit.      The patient has been notified of following:     \"This telephone visit will be conducted via a call between you and your physician/provider. We have found that certain health care needs can be provided without the need for a physical exam.  This service lets us provide the care you need with a short phone conversation.  If a prescription is necessary we can send it directly to your pharmacy.  If lab work is needed we can place an order for that and you can then stop by our lab to have the test done at a later time.    We will bill your insurance company for this service.  Please check with your medical insurance if this type of visit is covered. You may be responsible for the cost of this type of visit if insurance coverage is denied.  The typical cost is $30 (10min), $59(11-20min) and $85 (21-30min).  Most often these visits are shorter than 10 minutes.    If during the course of the call the physician/provider feels a telephone visit is not appropriate, you will not be charged for this service. \"     Consent has been obtained for this service by 1 care team member:yes. See the scanned image in the medical record.    Preferred patient phone number to be used for this call: [phone]     Norman OROZCO Case complains of  Anxiety and depression    History reviewed. No pertinent past medical history.   Social History     Social History     Marital status: Single     Spouse name: N/A     Number of children: 0     Years of education: N/A     Occupational History      [employer]     Social History Main Topics     Smoking status: Current Every Day Smoker     Packs/day: 0.50     Types: Cigarettes     Smokeless tobacco: Never Used      Comment: occassional     Alcohol use 2.5 oz/week     5 Standard drinks or equivalent per week     Drug use: No     Sexual activity: Yes     Partners: Female     Birth control/ protection: IUD "     Other Topics Concern     Not on file     Social History Narrative     ALLERGIES  Review of patient's allergies indicates no known allergies.        Faustina Singh, Certified Medical Assistant (AAMA)  (MA signature)    Additional provider notes:  1. Doing better - thinks things are improving   2. Wonders about an ADHD assessment. Wants a refill until that time. Used to be on Adderall. Tolerated it well. Was evaluated when younger but hasn't had an adult evaluation     Assessment/Plan:    ICD-10-CM    1. Major depressive disorder, recurrent, severe without psychotic features (H) F33.2    2. WILLIAM (generalized anxiety disorder) F41.1    3. Attention deficit hyperactivity disorder (ADHD), unspecified ADHD type F90.9 MENTAL HEALTH REFERRAL  - Adult; Assessments and Testing; ADHD; [facility] [phone]; We will contact you to schedule the appointment or please call with any questions     amphetamine-dextroamphetamine (ADDERALL XR) 20 MG per 24 hr capsule      Continue depression self cares, management, consider therapy, etc    ADHD evaluation - as noted - start Adderall. Needs adult evaluation. Referral given. This prescription is given with a discussion of side effects, risks and proper use.  Instructions are given to follow up if not improving or symptoms change or worsen as discussed. Per my chart review this sounds like a reasonable start - he's been on it before without issues.     Total time spent on this phone visit with the patient = 6 minutes     I have reviewed the note as documented above.  This accurately captures the substance of my conversation with the patient.    Kyle Corbin, JAZMIN, PA-C

## 2017-12-16 ASSESSMENT — ANXIETY QUESTIONNAIRES: GAD7 TOTAL SCORE: 4

## 2018-02-08 ENCOUNTER — TELEPHONE (OUTPATIENT)
Dept: FAMILY MEDICINE | Facility: CLINIC | Age: 33
End: 2018-02-08

## 2018-02-08 NOTE — TELEPHONE ENCOUNTER
Please repeat PHQ-9.  Index date: 8/3/17  Follow up start date:  1/3/18  Follow up end date:  3/3/18    Ruth Garcia MA

## 2018-11-23 ENCOUNTER — ALLIED HEALTH/NURSE VISIT (OUTPATIENT)
Dept: NURSING | Facility: CLINIC | Age: 33
End: 2018-11-23
Payer: COMMERCIAL

## 2018-11-23 DIAGNOSIS — Z23 NEED FOR PROPHYLACTIC VACCINATION AND INOCULATION AGAINST INFLUENZA: Primary | ICD-10-CM

## 2018-11-23 PROCEDURE — 99207 ZZC NO CHARGE LOS: CPT

## 2018-11-23 PROCEDURE — 90471 IMMUNIZATION ADMIN: CPT

## 2018-11-23 PROCEDURE — 90686 IIV4 VACC NO PRSV 0.5 ML IM: CPT

## 2018-11-23 NOTE — MR AVS SNAPSHOT
After Visit Summary   11/23/2018    Norman Shipley    MRN: [id number]           Patient Information     Date Of Birth          1985        Visit Information        Provider Department      11/23/2018 2:00 PM [facility] [place]        Today's Diagnoses     Need for prophylactic vaccination and inoculation against influenza    -  1       Follow-ups after your visit        Who to contact     If you have questions or need follow up information about today's clinic visit or your schedule please contact [facility] directly at [phone].  Normal or non-critical lab and imaging results will be communicated to you by MyChart, letter or phone within 4 business days after the clinic has received the results. If you do not hear from us within 7 days, please contact the clinic through MyChart or phone. If you have a critical or abnormal lab result, we will notify you by phone as soon as possible.  Submit refill requests through [employer] or call your pharmacy and they will forward the refill request to us. Please allow 3 business days for your refill to be completed.          Additional Information About Your Visit        Care EveryWhere ID     This is your Care EveryWhere ID. This could be used by other organizations to access your [place] medical records  [id number]         Blood Pressure from Last 3 Encounters:   08/03/17 116/74   12/02/16 129/81   11/09/16 144/87    Weight from Last 3 Encounters:   08/03/17 175 lb (79.4 kg)   12/02/16 174 lb (78.9 kg)   11/09/16 176 lb (79.8 kg)              We Performed the Following     FLU VACCINE, SPLIT VIRUS, IM (QUADRIVALENT) [62553]- >3 YRS     Vaccine Administration, Initial [21646]        Primary Care Provider Office Phone # Fax #    Kyle Corbin PA-C [phone] [address] [facility] [address]        Equal Access to Services     BHARATI REDDY AH: lashaun Weiss,  hunter gibsondoriangerman mondragonnoris mitchell jobyin hayaan adeeg kharash la'aan ah. So [facility] [phone].    ATENCIÓN: Si marley kaur, tiene a downing disposición servicios gratuitos de asistencia lingüística. Sonal al [phone].    We comply with applicable federal civil rights laws and [place] laws. We do not discriminate on the basis of race, color, national origin, age, disability, sex, sexual orientation, or gender identity.            Thank you!     [facility] you for [facility]  [facility] your [facility]. Our goal is always to provide you with excellent care. Hearing back from our patients is one way we can continue to improve our services. Please take a few minutes to complete the written survey that you may receive in the mail after your visit with us. Thank you!             Your Updated Medication List - Protect others around you: Learn how to safely use, store and throw away your medicines at www.disposemymeds.org.          This list is accurate as of 11/23/18  2:06 PM.  Always use your most recent med list.                   Brand Name Dispense Instructions for use Diagnosis    amphetamine-dextroamphetamine 20 MG 24 hr capsule    ADDERALL XR    30 capsule    Take 1 capsule (20 mg) by mouth daily    Attention deficit hyperactivity disorder (ADHD), unspecified ADHD type       hydrOXYzine 25 MG tablet    ATARAX    60 tablet    TAKE 1-2 TABLETS 1-2 TIMES DAILY AS NEEDED FOR PANIC/ANXIETY    Major depressive disorder, recurrent, severe without psychotic features (H), WILLIAM (generalized anxiety disorder), Adjustment disorder with mixed anxiety and depressed mood       sertraline 50 MG tablet    ZOLOFT    135 tablet    TAKE 1 AND 1/2 TABLETS BY MOUTH DAILY.    Major depressive disorder, recurrent, severe without psychotic features (H), WILLIAM (generalized anxiety disorder), Adjustment disorder with mixed anxiety and depressed mood

## 2018-11-23 NOTE — PROGRESS NOTES

## 2019-10-31 ENCOUNTER — ALLIED HEALTH/NURSE VISIT (OUTPATIENT)
Dept: NURSING | Facility: CLINIC | Age: 34
End: 2019-10-31
Payer: COMMERCIAL

## 2019-10-31 DIAGNOSIS — Z23 NEED FOR PROPHYLACTIC VACCINATION AND INOCULATION AGAINST INFLUENZA: Primary | ICD-10-CM

## 2019-10-31 PROCEDURE — 99207 ZZC NO CHARGE NURSE ONLY: CPT

## 2019-10-31 PROCEDURE — 90471 IMMUNIZATION ADMIN: CPT

## 2019-10-31 PROCEDURE — 90686 IIV4 VACC NO PRSV 0.5 ML IM: CPT
